# Patient Record
Sex: MALE | NOT HISPANIC OR LATINO
[De-identification: names, ages, dates, MRNs, and addresses within clinical notes are randomized per-mention and may not be internally consistent; named-entity substitution may affect disease eponyms.]

---

## 2017-09-09 ENCOUNTER — LAB SERVICES (OUTPATIENT)
Dept: OTHER | Age: 27
End: 2017-09-09

## 2017-09-09 ENCOUNTER — CHARTING TRANS (OUTPATIENT)
Dept: FAMILY MEDICINE | Age: 27
End: 2017-09-09

## 2017-09-09 LAB
ALBUMIN SERPL BCG-MCNC: 4.5 G/DL (ref 3.6–5.1)
ALP SERPL-CCNC: 49 U/L (ref 30–130)
ALT SERPL W/O P-5'-P-CCNC: 32 U/L (ref 17–47)
AST SERPL-CCNC: 25 U/L (ref 14–43)
BILIRUB SERPL-MCNC: 1 MG/DL (ref 0–1.3)
BUN SERPL-MCNC: 13 MG/DL (ref 6–27)
CALCIUM SERPL-MCNC: 9.1 MG/DL (ref 8.6–10.6)
CHLORIDE SERPL-SCNC: 103 MMOL/L (ref 96–107)
CREATININE, SERUM: 1.4 MG/DL (ref 0.6–1.6)
DIFFERENTIAL TYPE: ABNORMAL
GFR SERPL CREATININE-BSD FRML MDRD: >60 ML/MIN/{1.73M2}
GFR SERPL CREATININE-BSD FRML MDRD: >60 ML/MIN/{1.73M2}
GLUCOSE SERPL-MCNC: 88 MG/DL (ref 70–200)
HCO3 SERPL-SCNC: 28 MMOL/L (ref 22–32)
HEMATOCRIT: 41.5 % (ref 40–51)
HEMOGLOBIN: 15.1 G/DL (ref 13.7–17.5)
LYMPH PERCENT: 27.7 % (ref 20.5–51.1)
LYMPHOCYTE ABSOLUTE #: 1.4 10*3/UL (ref 1.2–3.4)
MEAN CORPUSCULAR HGB CONCENTRATION: 36.4 % (ref 32–36)
MEAN CORPUSCULAR HGB: 30.8 PG (ref 27–34)
MEAN CORPUSCULAR VOLUME: 84.7 FL (ref 79–95)
MEAN PLATELET VOLUME: 7.8 FL (ref 8.6–12.4)
MIXED %: 10.8 % (ref 4.3–12.9)
MIXED ABSOLUTE #: 0.6 10*3/UL (ref 0.2–0.9)
NEUTROPHIL ABSOLUTE #: 3.1 10*3/UL (ref 1.4–6.5)
NEUTROPHIL PERCENT: 61.5 % (ref 34–73.5)
PLATELET COUNT: 211 10*3/UL (ref 150–400)
POTASSIUM SERPL-SCNC: 4.8 MMOL/L (ref 3.5–5.3)
PROT SERPL-MCNC: 7.1 G/DL (ref 6.4–8.5)
RED BLOOD CELL COUNT: 4.9 10*6/UL (ref 3.9–5.7)
RED CELL DISTRIBUTION WIDTH: 12.8 % (ref 11.3–14.8)
SODIUM SERPL-SCNC: 142 MMOL/L (ref 136–146)
TSH SERPL DL<=0.05 MIU/L-ACNC: 1.49 M[IU]/L (ref 0.3–4.82)
WHITE BLOOD CELL COUNT: 5.1 10*3/UL (ref 4–10)

## 2017-12-29 ENCOUNTER — OFFICE VISIT (OUTPATIENT)
Dept: FAMILY MEDICINE CLINIC | Facility: CLINIC | Age: 27
End: 2017-12-29

## 2017-12-29 VITALS
RESPIRATION RATE: 16 BRPM | SYSTOLIC BLOOD PRESSURE: 124 MMHG | OXYGEN SATURATION: 99 % | TEMPERATURE: 98 F | DIASTOLIC BLOOD PRESSURE: 78 MMHG | HEART RATE: 90 BPM

## 2017-12-29 DIAGNOSIS — R09.81 SINUS CONGESTION: ICD-10-CM

## 2017-12-29 DIAGNOSIS — J06.9 UPPER RESPIRATORY TRACT INFECTION, UNSPECIFIED TYPE: Primary | ICD-10-CM

## 2017-12-29 PROCEDURE — 99202 OFFICE O/P NEW SF 15 MIN: CPT | Performed by: PHYSICIAN ASSISTANT

## 2017-12-29 RX ORDER — DOXYCYCLINE HYCLATE 100 MG
100 TABLET ORAL 2 TIMES DAILY
Qty: 20 TABLET | Refills: 0 | Status: SHIPPED | OUTPATIENT
Start: 2017-12-29 | End: 2018-01-28

## 2017-12-29 RX ORDER — BENZONATATE 200 MG/1
200 CAPSULE ORAL 3 TIMES DAILY PRN
Qty: 30 CAPSULE | Refills: 0 | Status: SHIPPED | OUTPATIENT
Start: 2017-12-29 | End: 2018-01-28

## 2017-12-29 RX ORDER — FLUTICASONE PROPIONATE 50 MCG
2 SPRAY, SUSPENSION (ML) NASAL DAILY
Qty: 1 BOTTLE | Refills: 0 | Status: SHIPPED | OUTPATIENT
Start: 2017-12-29 | End: 2018-01-28

## 2017-12-29 NOTE — PROGRESS NOTES
CHIEF COMPLAINT:   Patient presents with:  Sinusitis: 5-6 days. cough, sinus pressure, ear muffled left > Right. HPI:   Rhoda Warner is a 33year old male who presents for cold symptoms for  5 -6 days.  Symptoms have progressed into sinus congestion GENERAL:  intact appetite  SKIN: no rashes or abnormal skin lesions  HEENT: See HPI.     LUNGS: denies shortness of breath or wheezing, See HPI  CARDIOVASCULAR: denies chest pain or palpitations   GI: denies N/V/C or abdominal pain  NEURO: + sinus headach by mouth 3 (three) times daily as needed for cough. Doxycycline Hyclate 100 MG Oral Tab 20 tablet 0      Sig: Take 1 tablet (100 mg total) by mouth 2 (two) times daily. Risks, benefits, side effects of medication addressed and explained.

## 2017-12-29 NOTE — PATIENT INSTRUCTIONS
Viral Respiratory Illness [Adult]  You have an Upper Respiratory Illness (URI) caused by a virus. This illness is contagious during the first few days.  It is spread through the air by coughing and sneezing or by direct contact (touching the sick person a © 9489-1108 [address], [address] [place]. All rights reserved. This information is not intended as a substitute for professional medical care. Always follow your healthcare professional's instructions.

## 2018-01-28 ENCOUNTER — OFFICE VISIT (OUTPATIENT)
Dept: FAMILY MEDICINE CLINIC | Facility: CLINIC | Age: 28
End: 2018-01-28

## 2018-01-28 VITALS
RESPIRATION RATE: 20 BRPM | HEART RATE: 82 BPM | TEMPERATURE: 98 F | SYSTOLIC BLOOD PRESSURE: 132 MMHG | DIASTOLIC BLOOD PRESSURE: 72 MMHG | OXYGEN SATURATION: 98 %

## 2018-01-28 DIAGNOSIS — B35.4 TINEA OF THE BODY: ICD-10-CM

## 2018-01-28 DIAGNOSIS — N48.21 ABSCESS OF SHAFT OF PENIS: Primary | ICD-10-CM

## 2018-01-28 PROCEDURE — 99213 OFFICE O/P EST LOW 20 MIN: CPT | Performed by: NURSE PRACTITIONER

## 2018-01-28 RX ORDER — FLUCONAZOLE 100 MG/1
100 TABLET ORAL DAILY
Qty: 4 TABLET | Refills: 0 | Status: SHIPPED | OUTPATIENT
Start: 2018-01-28 | End: 2018-03-13 | Stop reason: ALTCHOICE

## 2018-01-28 RX ORDER — SULFAMETHOXAZOLE AND TRIMETHOPRIM 800; 160 MG/1; MG/1
1 TABLET ORAL 2 TIMES DAILY
Qty: 14 TABLET | Refills: 0 | Status: SHIPPED | OUTPATIENT
Start: 2018-01-28 | End: 2018-02-04

## 2018-01-28 NOTE — PROGRESS NOTES
CHIEF COMPLAINT:   Patient presents with:  Skin         HPI:   Dm Serrano is a 33year old male who presents for evaluation of a rash. Per patient rash started in the past couple days. Rash has been constant since onset.   Patient has had similar rash i GENERAL: feels well otherwise, no fever, no chills. SKIN: Per HPI. No edema. HEENT: Denies rhinorrhea, edema of the lips or swelling of throat. CARDIOVASCULAR: Denies chest pains or palpitations.   LUNGS: Denies shortness of breath with exertion or rest. Use the bactrim for infection, if not resolved to follow up  Use the diflucan for skin rash  No sexual activity until abscess is healed. Fungal Skin Infection (Tinea)  A fungal infection occurs when too much fungus grows on or in the body.  Fungus faith · If you were prescribed an oral medicine, read the patient information. Talk with your healthcare provider about the risks and side effects. · Let your skin dry completely after bathing. Carefully dry your feet and between your toes.   · Dress in loose co

## 2018-01-28 NOTE — PATIENT INSTRUCTIONS
Use the bactrim for infection, if not resolved to follow up  Use the diflucan for skin rash      Fungal Skin Infection (Tinea)  A fungal infection occurs when too much fungus grows on or in the body.  Fungus normally lives on the skin in small amounts and d · If you were prescribed an oral medicine, read the patient information. Talk with your healthcare provider about the risks and side effects. · Let your skin dry completely after bathing. Carefully dry your feet and between your toes.   · Dress in loose co

## 2018-03-13 ENCOUNTER — OFFICE VISIT (OUTPATIENT)
Dept: FAMILY MEDICINE CLINIC | Facility: CLINIC | Age: 28
End: 2018-03-13

## 2018-03-13 VITALS
TEMPERATURE: 98 F | OXYGEN SATURATION: 99 % | RESPIRATION RATE: 76 BRPM | HEART RATE: 76 BPM | SYSTOLIC BLOOD PRESSURE: 110 MMHG | DIASTOLIC BLOOD PRESSURE: 70 MMHG

## 2018-03-13 DIAGNOSIS — B00.1 HERPES LABIALIS: ICD-10-CM

## 2018-03-13 DIAGNOSIS — T70.29XA BAROTRAUMA, INITIAL ENCOUNTER: Primary | ICD-10-CM

## 2018-03-13 PROCEDURE — 99213 OFFICE O/P EST LOW 20 MIN: CPT | Performed by: PHYSICIAN ASSISTANT

## 2018-03-13 RX ORDER — FLUTICASONE PROPIONATE 50 MCG
2 SPRAY, SUSPENSION (ML) NASAL DAILY
Qty: 1 INHALER | Refills: 0 | Status: SHIPPED | OUTPATIENT
Start: 2018-03-13 | End: 2019-07-03

## 2018-03-13 RX ORDER — VALACYCLOVIR HYDROCHLORIDE 1 G/1
TABLET, FILM COATED ORAL
Qty: 12 TABLET | Refills: 0 | Status: SHIPPED | OUTPATIENT
Start: 2018-03-13 | End: 2019-07-03

## 2018-03-13 NOTE — PROGRESS NOTES
CHIEF COMPLAINT:   Patient presents with:  Ear Problem: left ear. Other: request refill acyclovir. HPI:     Leonor Gandhi is a 33year old male who presents to clinic today with concerns for a perforation of the left ear drum.   He was kick boxing a lesions  HEAD: atraumatic, normocephalic  EYES: conjunctiva clear, sclera non icteric  EARS: non tender with manipulation. External auditory canals are patent. Right TM: pearly gray  Left TM: pearly gray without perforation.   NOSE: nares patent, nasal m

## 2018-03-14 NOTE — PATIENT INSTRUCTIONS
Herpes  If you have herpes, you’re not alone. Millions of Americans have it. Herpes has no cure. But you can control it and learn how to protect yourself and others from outbreaks. What is herpes? Herpes is a chronic (lifelong) virus.  It can cause sore · Use a condom each time you have sex. You can pass the virus even when sores aren’t present. If you’re unsure about the timing of certain kinds of physical contact, ask your health care provider. · Tell any new partners that you have herpes.   · If you’re Often, self-care is all that is needed to relieve symptoms. Even a ruptured eardrum will most likely heal on its own. If it does not, surgery may be needed to repair it. In this case, you’ll be referred to a specialist for further care.   Home care  You may · Dizziness  Date Last Reviewed: 10/1/2017  © 9565-9193 [employer] [address]. [address] [facility] [place] [facility] [place]. All rights reserved. This information is not intended as a substitute for professional medical care.  Always follow your healthcare

## 2018-05-17 ENCOUNTER — CHARTING TRANS (OUTPATIENT)
Dept: OTHER | Age: 28
End: 2018-05-17

## 2018-05-29 ENCOUNTER — CHARTING TRANS (OUTPATIENT)
Dept: OTHER | Age: 28
End: 2018-05-29

## 2018-11-01 VITALS
TEMPERATURE: 98 F | DIASTOLIC BLOOD PRESSURE: 70 MMHG | OXYGEN SATURATION: 98 % | HEIGHT: 73 IN | SYSTOLIC BLOOD PRESSURE: 110 MMHG | RESPIRATION RATE: 18 BRPM | WEIGHT: 203.06 LBS | HEART RATE: 110 BPM | BODY MASS INDEX: 26.91 KG/M2

## 2018-11-01 VITALS
WEIGHT: 203.99 LBS | TEMPERATURE: 98 F | DIASTOLIC BLOOD PRESSURE: 78 MMHG | OXYGEN SATURATION: 97 % | BODY MASS INDEX: 27.04 KG/M2 | HEIGHT: 73 IN | SYSTOLIC BLOOD PRESSURE: 112 MMHG | RESPIRATION RATE: 15 BRPM | HEART RATE: 68 BPM

## 2018-11-28 VITALS
WEIGHT: 206 LBS | SYSTOLIC BLOOD PRESSURE: 110 MMHG | BODY MASS INDEX: 27.3 KG/M2 | TEMPERATURE: 96.7 F | DIASTOLIC BLOOD PRESSURE: 70 MMHG | HEART RATE: 67 BPM | HEIGHT: 73 IN | RESPIRATION RATE: 20 BRPM

## 2019-01-01 ENCOUNTER — EXTERNAL RECORD (OUTPATIENT)
Dept: HEALTH INFORMATION MANAGEMENT | Facility: OTHER | Age: 29
End: 2019-01-01

## 2019-11-13 ENCOUNTER — IMAGING SERVICES (OUTPATIENT)
Dept: GENERAL RADIOLOGY | Age: 29
End: 2019-11-13
Attending: ORTHOPAEDIC SURGERY

## 2019-11-13 ENCOUNTER — OFFICE VISIT (OUTPATIENT)
Dept: ORTHOPEDICS | Age: 29
End: 2019-11-13

## 2019-11-13 VITALS — WEIGHT: 210 LBS | BODY MASS INDEX: 27.83 KG/M2 | HEIGHT: 73 IN

## 2019-11-13 DIAGNOSIS — S33.6XXA SACROILIAC (LIGAMENT) SPRAIN, INITIAL ENCOUNTER: Primary | ICD-10-CM

## 2019-11-13 PROCEDURE — 73523 X-RAY EXAM HIPS BI 5/> VIEWS: CPT | Performed by: ORTHOPAEDIC SURGERY

## 2019-11-13 PROCEDURE — 99203 OFFICE O/P NEW LOW 30 MIN: CPT | Performed by: ORTHOPAEDIC SURGERY

## 2019-11-13 RX ORDER — NAPROXEN 375 MG/1
375 TABLET ORAL 2 TIMES DAILY WITH MEALS
COMMUNITY

## 2019-11-13 RX ORDER — METHYLPREDNISOLONE 4 MG/1
4 TABLET ORAL SEE ADMIN INSTRUCTIONS
Qty: 21 TABLET | Refills: 0 | Status: SHIPPED | OUTPATIENT
Start: 2019-11-13

## 2019-11-13 SDOH — HEALTH STABILITY: MENTAL HEALTH: HOW OFTEN DO YOU HAVE A DRINK CONTAINING ALCOHOL?: MONTHLY OR LESS
